# Patient Record
Sex: MALE | Race: BLACK OR AFRICAN AMERICAN
[De-identification: names, ages, dates, MRNs, and addresses within clinical notes are randomized per-mention and may not be internally consistent; named-entity substitution may affect disease eponyms.]

---

## 2018-04-30 ENCOUNTER — HOSPITAL ENCOUNTER (EMERGENCY)
Dept: HOSPITAL 17 - NEPJ | Age: 38
Discharge: HOME | End: 2018-04-30
Payer: COMMERCIAL

## 2018-04-30 VITALS
OXYGEN SATURATION: 98 % | HEART RATE: 85 BPM | SYSTOLIC BLOOD PRESSURE: 165 MMHG | TEMPERATURE: 98.6 F | DIASTOLIC BLOOD PRESSURE: 81 MMHG | RESPIRATION RATE: 16 BRPM

## 2018-04-30 VITALS — HEIGHT: 75 IN | WEIGHT: 198.42 LBS | BODY MASS INDEX: 24.67 KG/M2

## 2018-04-30 VITALS — TEMPERATURE: 98.2 F | DIASTOLIC BLOOD PRESSURE: 78 MMHG | SYSTOLIC BLOOD PRESSURE: 142 MMHG

## 2018-04-30 DIAGNOSIS — Z72.0: ICD-10-CM

## 2018-04-30 DIAGNOSIS — F23: Primary | ICD-10-CM

## 2018-04-30 LAB
BASOPHILS # BLD AUTO: 0.1 TH/MM3 (ref 0–0.2)
BASOPHILS NFR BLD: 0.9 % (ref 0–2)
BUN SERPL-MCNC: 14 MG/DL (ref 7–18)
CALCIUM SERPL-MCNC: 9.2 MG/DL (ref 8.5–10.1)
CHLORIDE SERPL-SCNC: 105 MEQ/L (ref 98–107)
CREAT SERPL-MCNC: 1.33 MG/DL (ref 0.6–1.3)
EOSINOPHIL # BLD: 0 TH/MM3 (ref 0–0.4)
EOSINOPHIL NFR BLD: 0.6 % (ref 0–4)
ERYTHROCYTE [DISTWIDTH] IN BLOOD BY AUTOMATED COUNT: 14 % (ref 11.6–17.2)
GFR SERPLBLD BASED ON 1.73 SQ M-ARVRAT: 60 ML/MIN (ref 89–?)
GLUCOSE SERPL-MCNC: 105 MG/DL (ref 74–106)
HCO3 BLD-SCNC: 25.8 MEQ/L (ref 21–32)
HCT VFR BLD CALC: 43.9 % (ref 39–51)
HGB BLD-MCNC: 14.9 GM/DL (ref 13–17)
LYMPHOCYTES # BLD AUTO: 1.5 TH/MM3 (ref 1–4.8)
LYMPHOCYTES NFR BLD AUTO: 20.4 % (ref 9–44)
MCH RBC QN AUTO: 29.2 PG (ref 27–34)
MCHC RBC AUTO-ENTMCNC: 34 % (ref 32–36)
MCV RBC AUTO: 85.9 FL (ref 80–100)
MONOCYTE #: 0.4 TH/MM3 (ref 0–0.9)
MONOCYTES NFR BLD: 5.8 % (ref 0–8)
NEUTROPHILS # BLD AUTO: 5.2 TH/MM3 (ref 1.8–7.7)
NEUTROPHILS NFR BLD AUTO: 72.3 % (ref 16–70)
PLATELET # BLD: 333 TH/MM3 (ref 150–450)
PMV BLD AUTO: 8.1 FL (ref 7–11)
RBC # BLD AUTO: 5.1 MIL/MM3 (ref 4.5–5.9)
SODIUM SERPL-SCNC: 139 MEQ/L (ref 136–145)
WBC # BLD AUTO: 7.2 TH/MM3 (ref 4–11)

## 2018-04-30 PROCEDURE — 84443 ASSAY THYROID STIM HORMONE: CPT

## 2018-04-30 PROCEDURE — 85025 COMPLETE CBC W/AUTO DIFF WBC: CPT

## 2018-04-30 PROCEDURE — 80307 DRUG TEST PRSMV CHEM ANLYZR: CPT

## 2018-04-30 PROCEDURE — 99283 EMERGENCY DEPT VISIT LOW MDM: CPT

## 2018-04-30 PROCEDURE — 80048 BASIC METABOLIC PNL TOTAL CA: CPT

## 2018-04-30 NOTE — PD
HPI


Chief Complaint:  Psychiatric Symptoms


Time Seen by Provider:  02:00


Travel History


International Travel<30 days:  No


Contact w/Intl Traveler<30days:  No


Traveled to known affect area:  No





History of Present Illness


HPI


37-year-old male with no documented psychiatric history presents the emergency 

department under [place] act for acute psychosis.  Patient has been increasingly 

paranoid.  He is responding to internal stimuli.  Patient states people are 

talking to him.  He was taken into law enforcement custody, watch, and then 

released.  Upon discharge from their facility, he contacted police stating that 

people were after him.  They decided to bring him here under Baker act.  

Patient does not offer much history except for that people are following him.  

He reports being paranoid.  He states this has not happened to him before.  He 

adamantly denies any psychiatric history.  He denies any illicit drug use.  He 

has no other symptoms to report.





[facility]


Past Medical History


Medical History:  Denies Significant Hx





Past Surgical History


Surgical History:  No Previous Surgery





Social History


Alcohol Use:  Yes


Tobacco Use:  Yes


Substance Use:  Yes





Allergies-Medications


(Allergen,Severity, Reaction):  


Coded Allergies:  


     No Known Allergies (Unverified , 4/30/18)


Reported Meds & Prescriptions





Reported Meds & Active Scripts


Active


No Active Prescriptions or Reported Medications    








Review of Systems


Except as stated in HPI:  all other systems reviewed are Neg





Physical Exam


Narrative


GENERAL: Well-nourished male patient, ambulatory, with bizarre affect, very 

watchful and paranoid activity, in no acute distress


SKIN: Focused skin assessment warm/dry.


HEAD: Atraumatic. Normocephalic. 


EYES: Pupils equal and round. No scleral icterus. No injection or drainage. 


ENT: No nasal bleeding or discharge.  Mucous membranes pink and moist.


NECK: Trachea midline. No JVD. 


CARDIOVASCULAR: Regular rate and rhythm.  No murmur appreciated.


RESPIRATORY: No accessory muscle use. Clear to auscultation. Breath sounds 

equal bilaterally. 


GASTROINTESTINAL: Abdomen soft, non-tender, nondistended. Hepatic and splenic 

margins not palpable. 


MUSCULOSKELETAL: No obvious deformities. No clubbing.  No cyanosis.  No edema. 


NEUROLOGICAL: Awake and alert. No obvious cranial nerve deficits.  Motor 

grossly within normal limits. Normal speech.





Data


Data


Last Documented VS





Vital Signs








  Date Time  Temp Pulse Resp B/P (MAP) Pulse Ox O2 Delivery O2 Flow Rate FiO2


 


4/30/18 01:39 98.6 85 16 165/81 (109) 98   








Orders





 Orders


Complete Blood Count With Diff (4/30/18 02:00)


Thyroid Stimulating Hormone (4/30/18 02:00)


Basic Metabolic Panel (Bmp) (4/30/18 02:00)


Psych Screen (4/30/18 02:00)


Drug Screen, Random Urine (4/30/18 02:00)


Alcohol (Ethanol) (4/30/18 02:00)


Olanzapine (Zyprexa) (4/30/18 02:15)








MDM


Medical Decision Making


Medical Screen Exam Complete:  Yes


Emergency Medical Condition:  Yes


Medical Record Reviewed:  Yes


Differential Diagnosis


Acute psychosis versus mood disorder versus personality disorder versus 

substance abuse.


Narrative Course


37-year-old male presents emergency department under Baker act for psychiatric 

evaluation.  Patient appears paranoid and anxious.  He is responding to 

internal stimuli.  I have discussed with the patient medication that may help 

him with these symptoms.  He agrees to this.  I will give him 1 dose of Zyprexa 

by mouth.  Lab work has been ordered for medical clearance.  Patient requests 

that we contact police who brought him in.  They are asked to return to the 

room prior to leaving the facility.  He tells the police that he prefers to be 

in a locked unit.  Patient be transferred to the psychiatric pod.


He is medically cleared to undergo psychiatric screening for further evaluation 

and disposition.





Diagnosis





 Primary Impression:  


 Paranoia (psychosis)


Scripts


No Active Prescriptions or Reported Meds


Condition:  Lashae Escamilla Apr 30, 2018 02:05

## 2018-04-30 NOTE — PD
Physical Exam


Time Seen by Provider:  14:54


SKYLAR Huntley has evaluated the patient, lifted the Baker act and cleared the 

patient for discharge.





Data


Data


Last Documented VS





Vital Signs








  Date Time  Temp Pulse Resp B/P (MAP) Pulse Ox O2 Delivery O2 Flow Rate FiO2


 


4/30/18 01:39 98.6 85 16 165/81 (109) 98   








Orders





 Orders


Complete Blood Count With Diff (4/30/18 02:00)


Thyroid Stimulating Hormone (4/30/18 02:00)


Basic Metabolic Panel (Bmp) (4/30/18 02:00)


Psych Screen (4/30/18 02:00)


Drug Screen, Random Urine (4/30/18 02:00)


Alcohol (Ethanol) (4/30/18 02:00)


Olanzapine (Zyprexa) (4/30/18 02:15)


Diet Regular Basic (4/30/18 Breakfast)


Diet Regular Basic (4/30/18 Lunch)





Labs





Laboratory Tests








Test


  4/30/18


01:42 4/30/18


02:55


 


White Blood Count 7.2 TH/MM3  


 


Red Blood Count 5.10 MIL/MM3  


 


Hemoglobin 14.9 GM/DL  


 


Hematocrit 43.9 %  


 


Mean Corpuscular Volume 85.9 FL  


 


Mean Corpuscular Hemoglobin 29.2 PG  


 


Mean Corpuscular Hemoglobin


Concent 34.0 % 


  


 


 


Red Cell Distribution Width 14.0 %  


 


Platelet Count 333 TH/MM3  


 


Mean Platelet Volume 8.1 FL  


 


Neutrophils (%) (Auto) 72.3 %  


 


Lymphocytes (%) (Auto) 20.4 %  


 


Monocytes (%) (Auto) 5.8 %  


 


Eosinophils (%) (Auto) 0.6 %  


 


Basophils (%) (Auto) 0.9 %  


 


Neutrophils # (Auto) 5.2 TH/MM3  


 


Lymphocytes # (Auto) 1.5 TH/MM3  


 


Monocytes # (Auto) 0.4 TH/MM3  


 


Eosinophils # (Auto) 0.0 TH/MM3  


 


Basophils # (Auto) 0.1 TH/MM3  


 


CBC Comment DIFF FINAL  


 


Differential Comment   


 


Blood Urea Nitrogen 14 MG/DL  


 


Creatinine 1.33 MG/DL  


 


Random Glucose 105 MG/DL  


 


Calcium Level 9.2 MG/DL  


 


Sodium Level 139 MEQ/L  


 


Potassium Level 3.7 MEQ/L  


 


Chloride Level 105 MEQ/L  


 


Carbon Dioxide Level 25.8 MEQ/L  


 


Anion Gap 8 MEQ/L  


 


Estimat Glomerular Filtration


Rate 60 ML/MIN 


  


 


 


Thyroid Stimulating Hormone


3rd Gen 1.790 uIU/ML 


  


 


 


Ethyl Alcohol Level


  LESS THAN 3


MG/DL 


 


 


Urine Opiates Screen  NEG 


 


Urine Barbiturates Screen  NEG 


 


Urine Amphetamines Screen  NEG 


 


Urine Benzodiazepines Screen  NEG 


 


Urine Cocaine Screen  NEG 


 


Urine Cannabinoids Screen  NEG 











MDM


Supervised Visit with SHAMIR:  No


Narrative Course


SKYLAR Perea has evaluated the patient, lifted the Baker act and cleared the 

patient for discharge.  Patient contracts safety.  Denies suicidal or homicidal 

ideations.  Patient will be provided community resource packet to [facility] for 

follow-up.  Has friends and family for support.  Patient was medically cleared 

by alternate provider prior to psych screening.  Patient has been evaluated by 

psychiatry and and is now cleared for discharge.


Diagnosis





 Primary Impression:  


 Paranoia (psychosis)


Referrals:  


[facility] (Out patient)





[facility]





Primary Care Physician





Psychiatrist





[facility]


Patient Instructions:  Brief Psychotic Disorder (ED), General Instructions, 

Mood Disorders (ED), Paranoid Personality Disorder (ED)





***Additional Instruction:  


Contract safety to your self and others


Follow-up with psychiatry


Follow-up with primary care provider


Follow-up with Vince Brewer


Return to the emergency department immediately with worsening of symptoms


***Med/Other Pt SpecificInfo:  No Change to Meds, No Meds Exist/No RX given


Scripts


No Active Prescriptions or Reported Meds


Disposition:  01 DISCHARGE HOME


Condition:  Stable











Deepthi Dukes Apr 30, 2018 14:56

## 2018-04-30 NOTE — PD
__________________________________________________





History of Present Illness


Chief Complaint:  Psychiatric Symptoms


Time Seen by Provider:  14:30


Travel History


International Travel<30 Days:  No


Contact w/Intl Traveler<30days:  No


Known affected area:  No





Legal Status


Legal Status:  Baker Act


Baker Act Signed By:  Iglesia INGRAM


History of Present Illness:


History of Present Illness


HPI


37-year-old, -American, single male with no documented or reported 

psychiatric history who presents the emergency department under Baker act for 

acute psychosis.  The patient reports that he called the police to inform them 

that there were multiple cars chasing him with long guns.  He also stated he 

did not feel safe with people chasing him.  He advised the police that he threw 

a fully loaded handgun out of the window on [address].  It is also alleged that the 

patient had been using narcotics.  As per documentation the patient had been 

taken into law enforcement custody and then released.  He denies any other 

previous episodes of paranoia and denies any previous psychiatric history.  He 

received 10 mg of Zyprexa while in the ED.  Patient has slept for the remainder 

of the time that he has been here.





EMR is reviewed.  No previous contact with [facility] psychiatry.  

Current toxicology is negative.





Patient is seen.  He is asleep but awakens with verbal prompting.  He is alert, 

oriented, calm.  His speech is clear, logical of low tone.  His affect is 

rather blunted.  He is guarded.  He denies feeling depressed.  Patient denies 

that he is experiencing any hallucinations and does not appear internally 

preoccupied.  He denies any thought of wanting to harm himself or to harm 

anyone else.  He still believes that people were actually after him and that he 

had not slept for several days due to paranoid thoughts.  He continues to deny 

that he uses any substances.  I have offered him the opportunity to remain in 

the hospital for further evaluation but the patient does not want to stay in 

the hospital.  Patient is wanting to be discharged as he is employed and wants 

to return back to the [place] area.





[facility]


Past Medical History


Medical History:  Denies Significant Hx





Past Surgical History


Surgical History:  No Previous Surgery





Psychiatric History


Psychiatric History


Hx Psychiatric Treatment:  


Patient denies any hx of inpatient or outpatient psychiatric treatment.


No history of suicide attempt


History of Inpatient Treatment:  No


Guns or firearms in home:  No (States he threw a gun he had out the car window)





Social History


Patient is from [facility].  He states he does not have any family 

here in [facility] [place].  He works for a [employer] company and has done so for about 1 

year.  Patient with recent legal history for unknown charges


Hx Alcohol Use:  Yes


Hx Tobacco Use:  Yes


Hx Substance Use:  No


Hx of Substance Use Treatment:  No





Family Psychiatric History


None reported





Allergies-Medications


(Allergen,Severity, Reaction):  


Coded Allergies:  


     No Known Allergies (Unverified , 4/30/18)


Reported Meds & Prescriptions





Reported Meds & Active Scripts


Active


No Active Prescriptions or Reported Medications





Review of Systems


Psychiatric:  COMPLAINS OF: Delusions


Except as stated in HPI:  all other systems reviewed are Neg





Mental Status Examination


Appearance:  Appropriate (Dressed in hospital paper scrubs and maintaining 

basic hygiene)


Consciousness:  Alert


Orientation:  x4


Motor Activity:  Normal gait


Speech:  Unremarkable, Other (Low tone)


Language:  Adequate


Fund of Knowledge:  Adequate


Attention and Concentration:  Adequate


Memory:  Unremarkable


Mood:  Appropriate


Affect:  Blunt


Thought Process & Associations:  Intact, Logical, Goal directed


Thought Content:  Delusional


Hallucination Type:  None


Delusion Type:  Paranoid (That unknown people were following him)


Suicidal Ideation:  No


Suicidal Plan:  No


Suicidal Intention:  No


Homicidal Ideation:  No


Homicidal Plan:  No


Homicidal Intention:  No


Insight:  Fair


Judgment:  Adequate





MDM


Medical Decision Making


Medical Record Reviewed:  Yes


Assessment/Plan


37-year-old male with no reported psychiatric history who after being 

incarcerated was released and he later called the police reporting that he was 

feeling like people were after him.  He states that he had a gun he threw out 

the car window.  He was also reporting he did not feel safe with people chasing 

him.  The patient denies having used any substances and has a negative 

toxicology but they are substances that we do not test for in our  drug panel.  

The patient denies any suicidal or homicidal ideation.  The patient has not 

been responding to internal stimuli although he appears somewhat guarded.  The 

patient has not been agitated while in [facility] pod.  He is requesting to be 

discharged and does not present any criteria to be held against his will at 

this time.  He was offered the opportunity to remain in the hospital for 

further observation but he has declined stating he needs to get back to 

[place] to work.  At this time the patient has slapped and appears that 

the psychosis has resolved.  He is psychiatrically clear for discharge from the 

ED and the [place] act is lifted.





Orders





 Orders


Complete Blood Count With Diff (4/30/18 02:00)


Thyroid Stimulating Hormone (4/30/18 02:00)


Basic Metabolic Panel (Bmp) (4/30/18 02:00)


Psych Screen (4/30/18 02:00)


Drug Screen, Random Urine (4/30/18 02:00)


Alcohol (Ethanol) (4/30/18 02:00)


Olanzapine (Zyprexa) (4/30/18 02:15)


Diet Regular Basic (4/30/18 Breakfast)


Diet Regular Basic (4/30/18 Lunch)





Results





Vital Signs








  Date Time  Temp Pulse Resp B/P (MAP) Pulse Ox O2 Delivery O2 Flow Rate FiO2


 


4/30/18 01:39 98.6 85 16 165/81 (109) 98   











Laboratory Tests








Test


  4/30/18


01:42 4/30/18


02:55


 


White Blood Count 7.2  


 


Red Blood Count 5.10  


 


Hemoglobin 14.9  


 


Hematocrit 43.9  


 


Mean Corpuscular Volume 85.9  


 


Mean Corpuscular Hemoglobin 29.2  


 


Mean Corpuscular Hemoglobin


Concent 34.0 


  


 


 


Red Cell Distribution Width 14.0  


 


Platelet Count 333  


 


Mean Platelet Volume 8.1  


 


Neutrophils (%) (Auto) 72.3  


 


Lymphocytes (%) (Auto) 20.4  


 


Monocytes (%) (Auto) 5.8  


 


Eosinophils (%) (Auto) 0.6  


 


Basophils (%) (Auto) 0.9  


 


Neutrophils # (Auto) 5.2  


 


Lymphocytes # (Auto) 1.5  


 


Monocytes # (Auto) 0.4  


 


Eosinophils # (Auto) 0.0  


 


Basophils # (Auto) 0.1  


 


CBC Comment DIFF FINAL  


 


Differential Comment   


 


Blood Urea Nitrogen 14  


 


Creatinine 1.33  


 


Random Glucose 105  


 


Calcium Level 9.2  


 


Sodium Level 139  


 


Potassium Level 3.7  


 


Chloride Level 105  


 


Carbon Dioxide Level 25.8  


 


Anion Gap 8  


 


Estimat Glomerular Filtration


Rate 60 


  


 


 


Thyroid Stimulating Hormone


3rd Gen 1.790 


  


 


 


Ethyl Alcohol Level LESS THAN 3  


 


Urine Opiates Screen  NEG 


 


Urine Barbiturates Screen  NEG 


 


Urine Amphetamines Screen  NEG 


 


Urine Benzodiazepines Screen  NEG 


 


Urine Cocaine Screen  NEG 


 


Urine Cannabinoids Screen  NEG 











Diagnosis





 Primary Impression:  


 Brief psychotic disorder


Psychiatrically Cleared:  Yes


***Med/ Other Pt Specific Info:  No Meds Exist/No RX given


Prescriptions


No Active Prescriptions or Reported Meds


Disposition:  01 DISCHARGE HOME


Condition:  Stable











Brit Villalpando Apr 30, 2018 14:57